# Patient Record
Sex: FEMALE | HISPANIC OR LATINO | URBAN - NONMETROPOLITAN AREA
[De-identification: names, ages, dates, MRNs, and addresses within clinical notes are randomized per-mention and may not be internally consistent; named-entity substitution may affect disease eponyms.]

---

## 2017-06-07 NOTE — PATIENT DISCUSSION
Stopped Today: Systane Ultra (PF) (peg 400-propylene glycol (pf)): dropperette: 0.4-0.3% 1 drop twice a day into both eyes

## 2017-06-07 NOTE — PATIENT DISCUSSION
AMD (DRY), OU:  PRESCRIBE AREDS 2 VITAMINS / AMSLER GRID DAILY / UV PROTECTION. SMOKING CESSATION EMPHASIZED. RETURN FOR FOLLOW-UP AS SCHEDULED. REFER TO DR. Lynette Wetzel FOR EVAL.

## 2017-06-07 NOTE — PATIENT DISCUSSION
New Prescription: PreserVision AREDS 2 (vit c,o-jk-dnysu-lutein-zeaxan): capsule: 207-788-70-4 mg-unit-mg-mg 1 unit twice a day as directed by mouth

## 2017-07-13 NOTE — PATIENT DISCUSSION
RECURRENT CORNEAL EROSION OD &gt; OS:  WITH NEGATIVE STAINING. USE PRESERVATIVE FREE ARTIFICIAL TEARS QID OR AS OFTEN AS NEEDED FOR COMFORT OU AND REFRESH PM QHS. SCHEDULE DR GRIFFIN CONSULT IF RCE BECOMES MORE FREQUENT.

## 2017-07-13 NOTE — PATIENT DISCUSSION
DRY EYE SYNDROME/KCS, OU: PRESCRIBED REFRESH PM ALESSIO QHS OU. CONTINUE WITH ARTIFICIAL TEARS BID - QID, OU. ADD XIIDRA BID OU. RETURN FOR FOLLOW-UP AS SCHEDULED OR SOONER IF SYMPTOMS WORSEN.

## 2017-07-13 NOTE — PATIENT DISCUSSION
New Prescription: Kevin Hernandez (lifitegrast): dropperette: 5% 1 drop twice a day into both eyes 07-

## 2017-07-13 NOTE — PATIENT DISCUSSION
Recurrent Corneal Erosion Counseling: The diagnosis and its pathophysiology was explained to the patient. The importance of continued follow-up including yearly endothelial cell counts and pachymetry measurements was also explained. The patient was counseled on the prescribed medical treatment and the early morning blurring of vision which will improve as the day progresses. The patient was advised of the possibility of decreased vision over time secondary to corneal edema. Return for follow-up as scheduled.

## 2017-07-13 NOTE — PATIENT DISCUSSION
Continue: PreserVision AREDS 2 (vit c,t-dt-rbkdg-lutein-zeaxan): capsule: 432-467-26-8 mg-unit-mg-mg 1 unit twice a day as directed by mouth

## 2017-10-03 NOTE — PATIENT DISCUSSION
Continue: PreserVision AREDS 2 (vit c,j-ld-mpyil-lutein-zeaxan): capsule: 557-285-30-2 mg-unit-mg-mg 1 unit twice a day as directed by mouth

## 2017-11-20 NOTE — PATIENT DISCUSSION
Continue: PreserVision AREDS 2 (vit c,e-ho-ighfg-lutein-zeaxan): capsule: 604-082-83-3 mg-unit-mg-mg 1 unit twice a day as directed by mouth

## 2017-11-20 NOTE — PATIENT DISCUSSION
MODERATE KERATOCONJUCTIVITIS WITH DRY EYE, OU:  CONTINUE HIGH QUALITY ARTIFICIAL TEARS BID -TID. RECOMMEND THE DAILY INTAKE OF OMEGA-3 DHA/EPA FATTY ACIDS TO HELP RELIEVE SYMPTOMS WITH PRIMARY CARE PHYSICIANS APPROVAL. CONTINUE RESTASIS BID OU. WILL CONSIDER PUNCTAL PLUGS NEXT VISIT IF NOT RESPONSIVE OR IF SYMPTOMS PERSIST. RETURN FOR FOLLOW-UP AS SCHEDULED OR SOONER IF SYMPTOMS WORSEN.

## 2018-05-21 NOTE — PATIENT DISCUSSION
AMD (DRY), OU:  PRESCRIBE AREDS 2 VITAMINS / AMSLER GRID DAILY / UV PROTECTION. SMOKING AVOIDANCE ADVISED. RETURN FOR FOLLOW-UP AS SCHEDULED. REFER TO  [facility] FOR EVALUATION.

## 2018-05-21 NOTE — PATIENT DISCUSSION
Continue: PreserVision AREDS 2 (vit c,c-lx-efwnt-lutein-zeaxan): capsule: 918-861-03-1 mg-unit-mg-mg 1 unit twice a day as directed by mouth

## 2019-07-10 NOTE — PATIENT DISCUSSION
GLAUCOMA SUSPECT, OU :  POSITIVE FAMILY HISTORY AND INCREASED OPTIC NERVE THINNING ON OCT. RETURN FOR FOLLOW UP AS SCHEDULED.

## 2019-07-10 NOTE — PATIENT DISCUSSION
DERMATOCHALASIS / PTOSIS RUL AND CRYSTAL :  VISUALLY SIGNIFICANT TO PATIENT. SCHEDULE WITH OCULOPLASTIC SPECIALIST IF PATIENT DESIRES.

## 2019-07-10 NOTE — PATIENT DISCUSSION
Continue: PreserVision AREDS 2 (vit c,y-hr-svbnj-lutein-zeaxan): capsule: 845-083-60-8 mg-unit-mg-mg 1 unit twice a day as directed by mouth

## 2019-07-10 NOTE — PATIENT DISCUSSION
MODERATE DRY EYE, OU: PRESCRIBED ARTIFICIAL TEARS 2-3 X A DAY OU. RECOMMENDS OMEGA-3 FISH OIL WITH PRIMARY CARE PHYSICIANS APPROVAL. RETURN FOR FOLLOW-UP AS SCHEDULED OR SOONER IF SYMPTOMS WORSEN.

## 2019-07-10 NOTE — PATIENT DISCUSSION
Continue: Artificial Tears (PF) (dextran 70-hypromellose (pf)): dropperette: 0.1-0.3% 1 drop four times a day as needed into both eyes

## 2020-01-08 NOTE — PATIENT DISCUSSION
*PCF OS BECOMING VISUALLY SIGNIFICANT BUT NOT BOTHERSOME TO PATIENT AT THIS TIME. CONTINUE TO FOLLOW FOR NOW. OFFER SPEC RX UPDATE. WILL CONSIDER A YAG AT NEXT EXAM, AFTER PT SEES DR. CASTRO FOR CLEARANCE.

## 2020-01-08 NOTE — PATIENT DISCUSSION
AMD (DRY), OU:  PRESCRIBE AREDS 2 VITAMINS AND RECOMMEND UV PROTECTION. PATIENT IS AWARE OF AMSLER GRID AND HOW TO CHECK FOR PROGRESSION. SMOKING AVOIDANCE ADVISED. STRESSED THAT PT NEED TO SEE DR. CASTRO  - REFERRED LAST YEAR, BUT APPT WAS CANCELLED PER PT.

## 2020-01-08 NOTE — PATIENT DISCUSSION
Continue: PreserVision AREDS 2 (vit c,x-ip-dhran-lutein-zeaxan): capsule: 863-255-12-4 mg-unit-mg-mg 1 unit twice a day as directed by mouth

## 2020-02-07 NOTE — PATIENT DISCUSSION
GLAUCOMA SUSPECT, OU :  HVF IS WITHIN ACCEPTABLE LIMITS. AVG PACHS. NO DROP THERAPY NEEDED AT THIS TIME. RTC AS SCHEDULED.

## 2020-02-07 NOTE — PATIENT DISCUSSION
Continue: PreserVision AREDS 2 (vit c,x-ue-xveax-lutein-zeaxan): capsule: 060-901-39-5 mg-unit-mg-mg 1 unit twice a day as directed by mouth

## 2020-02-26 NOTE — PATIENT DISCUSSION
Continue: PreserVision AREDS 2 (vit c,v-vl-krmss-lutein-zeaxan): capsule: 142-019-00-7 mg-unit-mg-mg 1 unit twice a day as directed by mouth

## 2020-06-03 NOTE — PATIENT DISCUSSION
DERMATOCHALASIS / PTOSIS RUL:  VISUALLY SIGNIFICANT TO PATIENT. SCHEDULE WITH OCULOPLASTIC SPECIALIST IF PATIENT DESIRES.

## 2020-06-03 NOTE — PATIENT DISCUSSION
Continue: PreserVision AREDS 2 (vit c,k-yg-lkeul-lutein-zeaxan): capsule: 819-453-61-6 mg-unit-mg-mg 1 unit twice a day as directed by mouth

## 2020-06-03 NOTE — PATIENT DISCUSSION
*MILD TO MODERATE DRY EYE, OU: PRESCRIBED ARTIFICIAL TEARS OR CELLUVISC BID-TID , AND USE CELLUVISC QHS OU, WITH OMEGA-3 FISH OIL. RETURN FOR FOLLOW-UP AS SCHEDULED OR SOONER IF SYMPTOMS WORSEN.

## 2020-07-27 NOTE — PATIENT DISCUSSION
Continue: PreserVision AREDS 2 (vit c,b-le-gijly-lutein-zeaxan): capsule: 242-291-90-0 mg-unit-mg-mg 1 unit twice a day as directed by mouth

## 2021-04-07 NOTE — PATIENT DISCUSSION
GLAUCOMA (SUSPECT) OU :  POSITIVE FAMILY HISTORY. THINNING OF OPTIC NERVE ON OCT OU. SCHEDULE HVF. RETURN FOR FOLLOW UP AS SCHEDULED.

## 2021-04-07 NOTE — PATIENT DISCUSSION
AMD (DRY), OU:  PRESCRIBE AREDS 2 VITAMINS AND RECOMMEND UV PROTECTION. PATIENT IS AWARE OF AMSLER GRID AND HOW TO CHECK FOR PROGRESSION. SMOKING AVOIDANCE ADVISED. RETURN TO DR. Iker Smith IN THE NEXT 2 MONTHS.

## 2021-04-07 NOTE — PATIENT DISCUSSION
Continue: PreserVision AREDS 2 (vit c,o-oj-yrhyw-lutein-zeaxan): capsule: 038-262-22-7 mg-unit-mg-mg 1 unit twice a day as directed by mouth

## 2021-05-06 ENCOUNTER — IMPORTED ENCOUNTER (OUTPATIENT)
Dept: URBAN - NONMETROPOLITAN AREA CLINIC 1 | Facility: CLINIC | Age: 36
End: 2021-05-06

## 2021-05-06 PROBLEM — H52.223: Noted: 2021-05-06

## 2021-05-06 PROBLEM — H52.02: Noted: 2021-05-06

## 2021-05-06 PROBLEM — H52.11: Noted: 2021-05-06

## 2021-05-06 PROCEDURE — S0620 ROUTINE OPHTHALMOLOGICAL EXA: HCPCS

## 2021-05-06 NOTE — PATIENT DISCUSSION
Myopia-Discussed diagnosis with patient. -Explained that people who are myopic are at a higher risk for developing RD/RT and reviewed associated S&S.-Pt to contact our office if symptoms develop. Hyperopia-Discussed diagnosis with patient. Astigmatism-Discussed diagnosis with patient. Updated spec Rx given. Recommend lens that will provide comfort as well as protect safety and health of eyes.

## 2021-12-01 NOTE — PATIENT DISCUSSION
A glaucoma suspect is a person with one or more risk factors that may lead to glaucoma. I have explained to the patient that glaucoma potentially causes loss of peripheral vision due to damage to the optic nerve that is irreversible. I have explained to the patient that successful management is dependent on patient compliance with treatment as prescribed and/or regular follow-up to monitor for possible progression.

## 2021-12-01 NOTE — PATIENT DISCUSSION
Macular OCT suggests new onset neovascularization. Return to Dr. Jordan Cornell in the next 2 weeks for evaluation & probable treatment.

## 2021-12-01 NOTE — PATIENT DISCUSSION
Suspect due to large C/D ratios, OCT thinning & positive family history.  The IOP is within normal range.  OCT shows RNFL thinning that is stable. Explained to the patient the importance of HVF.

## 2022-04-15 ASSESSMENT — VISUAL ACUITY
OD_CC: 20/20
OS_CC: 20/20

## 2022-04-15 ASSESSMENT — TONOMETRY
OD_IOP_MMHG: 14
OS_IOP_MMHG: 14

## 2022-06-06 NOTE — PATIENT DISCUSSION
Patient has missed a couple appointment with Dr. Althea Gustafson and had to rescheduled due to other medical conditions.

## 2022-06-20 NOTE — PATIENT DISCUSSION
High risk PED OD without definite signs of active CNV, subretinal membrane, or hemorrhage.  Patient deferred treatment at this time and elected for close observation.